# Patient Record
Sex: FEMALE | Race: WHITE | NOT HISPANIC OR LATINO | Employment: FULL TIME | URBAN - METROPOLITAN AREA
[De-identification: names, ages, dates, MRNs, and addresses within clinical notes are randomized per-mention and may not be internally consistent; named-entity substitution may affect disease eponyms.]

---

## 2023-01-04 ENCOUNTER — PROCEDURE VISIT (OUTPATIENT)
Dept: OBSTETRICS AND GYNECOLOGY | Facility: CLINIC | Age: 73
End: 2023-01-04
Payer: COMMERCIAL

## 2023-01-04 ENCOUNTER — OFFICE VISIT (OUTPATIENT)
Dept: OBSTETRICS AND GYNECOLOGY | Facility: CLINIC | Age: 73
End: 2023-01-04
Payer: COMMERCIAL

## 2023-01-04 VITALS
DIASTOLIC BLOOD PRESSURE: 60 MMHG | BODY MASS INDEX: 22.5 KG/M2 | HEIGHT: 66 IN | WEIGHT: 140 LBS | SYSTOLIC BLOOD PRESSURE: 108 MMHG

## 2023-01-04 DIAGNOSIS — Z13.220 ENCOUNTER FOR LIPID SCREENING FOR CARDIOVASCULAR DISEASE: ICD-10-CM

## 2023-01-04 DIAGNOSIS — Z01.419 ENCOUNTER FOR GYNECOLOGICAL EXAMINATION WITHOUT ABNORMAL FINDING: Primary | ICD-10-CM

## 2023-01-04 DIAGNOSIS — Z78.0 POSTMENOPAUSAL STATE: ICD-10-CM

## 2023-01-04 DIAGNOSIS — Z12.31 VISIT FOR SCREENING MAMMOGRAM: Primary | ICD-10-CM

## 2023-01-04 DIAGNOSIS — T73.3XXA FATIGUE DUE TO EXCESSIVE EXERTION, INITIAL ENCOUNTER: ICD-10-CM

## 2023-01-04 DIAGNOSIS — Z13.6 ENCOUNTER FOR LIPID SCREENING FOR CARDIOVASCULAR DISEASE: ICD-10-CM

## 2023-01-04 PROCEDURE — 99387 INIT PM E/M NEW PAT 65+ YRS: CPT | Performed by: OBSTETRICS & GYNECOLOGY

## 2023-01-04 PROCEDURE — 77067 SCR MAMMO BI INCL CAD: CPT | Performed by: OBSTETRICS & GYNECOLOGY

## 2023-01-04 PROCEDURE — 77063 BREAST TOMOSYNTHESIS BI: CPT | Performed by: OBSTETRICS & GYNECOLOGY

## 2023-01-04 NOTE — PROGRESS NOTES
GYN Annual Exam     CC- Here for annual exam.     Lilia Rosado is a 72 y.o. female who presents for annual well woman exam. She is menopausal.  She is experiencing and/or reports no new menopausal symptoms.  She denies postmenopausal vaginal bleeding.  She denies abdominal pain, diarrhea and cough.  Today, she wishes to specifically address just routine screening measures.  I explained to her that current ACOG guidelines state that screening Pap smears are no longer recommended after the age of 65, nor after hysterectomy for benign reasons.  She had a brief outbreak of some vaginal and vulvar lesions and thought it was due to potentially asymptomatic COVID.  These lesions resolved and she does request a Pap smear.    OB History        4    Para   4    Term   4            AB        Living   4       SAB        IAB        Ectopic        Molar        Multiple        Live Births   4                Current contraception: post menopausal status  History of abnormal Pap smear: no  Family history of uterine, colon or ovarian cancer: yes - Paternal grandfather  History of abnormal mammogram: no  Family history of breast cancer: yes - Maternal grandmother  Last Pap : 5 years ago  Last mammogram: Several years ago  Last colonoscopy:   Last DEXA: Due to be scheduled      History reviewed. No pertinent past medical history.    Past Surgical History:   Procedure Laterality Date   • BREAST BIOPSY     • WISDOM TOOTH EXTRACTION         No current outpatient medications on file.    Allergies   Allergen Reactions   • Penicillins Hives and Swelling   • Aspirin Swelling   • Sulfa Antibiotics Swelling   • Erythromycin Nausea And Vomiting       Social History     Tobacco Use   • Smoking status: Never   • Smokeless tobacco: Never   Vaping Use   • Vaping Use: Never used   Substance Use Topics   • Alcohol use: Never   • Drug use: Never       Family History   Problem Relation Age of Onset   • Hypertension Father    •  Hypertension Mother    • Hypertension Brother    • Colon cancer Paternal Grandfather    • Breast cancer Maternal Grandmother        Review of Systems   Constitutional: Positive for fatigue. Negative for fever.   Respiratory: Negative for cough and shortness of breath.    Genitourinary: Negative for genital sores, urinary incontinence and vaginal bleeding.   Patient reports that she is not currently experiencing any symptoms of urinary incontinence.      /60   Ht 167.6 cm (66\")   Wt 63.5 kg (140 lb)   BMI 22.60 kg/m²     Physical Exam  Constitutional:       Appearance: She is normal weight.   Genitourinary:      Bladder and urethral meatus normal.      No lesions in the vagina.      Right Labia: No lesions.     Left Labia: No lesions.     No vaginal discharge, tenderness or bleeding.      No vaginal prolapse present.     No vaginal atrophy present.       Right Adnexa: not tender, not full and no mass present.     Left Adnexa: not tender, not full and no mass present.     No cervical motion tenderness, discharge or lesion.      Uterus is not enlarged, fixed or tender.      No uterine mass detected.     Uterus is midaxial.   Breasts:     Right: No mass, nipple discharge, skin change or tenderness.      Left: No mass, nipple discharge, skin change or tenderness.   Neck:      Thyroid: No thyroid mass or thyromegaly.   Abdominal:      General: Abdomen is flat.      Palpations: Abdomen is soft. There is no mass.      Tenderness: There is no abdominal tenderness.   Neurological:      Mental Status: She is alert.   Vitals reviewed.             Assessment     1) GYN annual well woman exam.   2) menopausal state.  Doing well.  We will get screening CBC and TSH and lipid panel today.  Will order DEXA study to get her updated.     Plan     1) Breast Health - Clinical breast exam & mammogram reviewed specifically American [employer] Society recommendations for screening specific to her, and Self breast awareness monthly  2)  Pap -done today  3) Smoking status-negative  4) Colon health - screening colonoscopy recommended if not up to date  5) Bone health - Weight bearing exercise, dietary calcium recommendations and vitamin D reviewed.   6) Encouraged to be wary of information obtained via social media and internet based on source and search.   7) Follow up prn and one year      Hola Modi MD   1/4/2023  11:51 EST

## 2023-01-05 LAB
CHOLEST SERPL-MCNC: 290 MG/DL (ref 0–200)
ERYTHROCYTE [DISTWIDTH] IN BLOOD BY AUTOMATED COUNT: 12.7 % (ref 12.3–15.4)
HCT VFR BLD AUTO: 39.8 % (ref 34–46.6)
HDLC SERPL-MCNC: 45 MG/DL (ref 40–60)
HGB BLD-MCNC: 13.3 G/DL (ref 12–15.9)
LDLC SERPL CALC-MCNC: 208 MG/DL (ref 0–100)
MCH RBC QN AUTO: 27.5 PG (ref 26.6–33)
MCHC RBC AUTO-ENTMCNC: 33.4 G/DL (ref 31.5–35.7)
MCV RBC AUTO: 82.2 FL (ref 79–97)
PLATELET # BLD AUTO: 283 10*3/MM3 (ref 140–450)
RBC # BLD AUTO: 4.84 10*6/MM3 (ref 3.77–5.28)
TRIGL SERPL-MCNC: 192 MG/DL (ref 0–150)
TSH SERPL DL<=0.005 MIU/L-ACNC: 1.28 UIU/ML (ref 0.27–4.2)
VLDLC SERPL CALC-MCNC: 37 MG/DL (ref 5–40)
WBC # BLD AUTO: 7.29 10*3/MM3 (ref 3.4–10.8)

## 2023-01-09 ENCOUNTER — PATIENT ROUNDING (BHMG ONLY) (OUTPATIENT)
Dept: OBSTETRICS AND GYNECOLOGY | Facility: CLINIC | Age: 73
End: 2023-01-09
Payer: COMMERCIAL

## 2023-01-09 ENCOUNTER — PATIENT MESSAGE (OUTPATIENT)
Dept: OBSTETRICS AND GYNECOLOGY | Facility: CLINIC | Age: 73
End: 2023-01-09
Payer: COMMERCIAL

## 2023-01-09 NOTE — PROGRESS NOTES
My chart message has been sent to the patient for PATIENT ROUNDING with [facility].

## 2023-01-10 LAB
CONV .: NORMAL
CYTOLOGIST CVX/VAG CYTO: NORMAL
CYTOLOGY CVX/VAG DOC CYTO: NORMAL
CYTOLOGY CVX/VAG DOC THIN PREP: NORMAL
DX ICD CODE: NORMAL
HIV 1 & 2 AB SER-IMP: NORMAL
OTHER STN SPEC: NORMAL
STAT OF ADQ CVX/VAG CYTO-IMP: NORMAL

## 2023-06-05 ENCOUNTER — TELEPHONE (OUTPATIENT)
Dept: OBSTETRICS AND GYNECOLOGY | Facility: CLINIC | Age: 73
End: 2023-06-05
Payer: MEDICARE

## 2023-06-05 NOTE — TELEPHONE ENCOUNTER
Patient is requesting to be seen today. Last Friday, patient states that her left breast became inflamed and irritated, she also noticed small little dent spot on her breast. Patient states that the inflammation has resided but she still has the dent like spots on her breast.     Can patient be seen with you today or sometime this week ?      Please advise,  Thank you  ( pt)

## 2023-06-06 ENCOUNTER — TELEPHONE (OUTPATIENT)
Dept: OBSTETRICS AND GYNECOLOGY | Facility: CLINIC | Age: 73
End: 2023-06-06
Payer: MEDICARE

## 2023-06-06 NOTE — TELEPHONE ENCOUNTER
----- Message from MADDIE Bansal sent at 6/6/2023 11:07 AM EDT -----  Regarding: Inflamed Left Breast  Contact: [phone]  I did not receive a message about this. Please schedule her with f/u this week. Okay to use blocked slots. -Katy       ----- Message -----  From: Ivana Marroquin RN  Sent: 6/6/2023  10:22 AM EDT  To: MADDIE Bansal  Subject: Inflamed Left Breast                             My Chart. AE & Mx 1/4/23 (saw Dr Modi when he was at [facility]). Left breast is inflamed and concerned about inflammatory breast cancer and breast has a burning pain. States she was told the APRN would call her, but the message went to Dr Leong. I   was not sure if you could get her in sooner. Thank you.      ----- Message -----  From: Lilia Rosado  Sent: 6/6/2023   9:41 AM EDT  To: [facility] Clinical [facility]  Subject: Inflamed Left Breast                             I called yesterday and explained my left breast was inflamed and was extremely worried about inflammatory breast cancer. My breast has a burning pain. I was told the nurse practitioner would call. Please call me at [phone].

## 2023-06-06 NOTE — TELEPHONE ENCOUNTER
Spoke with Lilia and she said that Dr Modi is a family friend. Scheduled her to see MADDIE Masterson on Thursday, 6/8/23 at 9:45 am for breast pain and burning. Thank you

## 2023-06-08 ENCOUNTER — TELEPHONE (OUTPATIENT)
Dept: OBSTETRICS AND GYNECOLOGY | Facility: CLINIC | Age: 73
End: 2023-06-08

## 2023-06-08 ENCOUNTER — OFFICE VISIT (OUTPATIENT)
Dept: OBSTETRICS AND GYNECOLOGY | Facility: CLINIC | Age: 73
End: 2023-06-08
Payer: MEDICARE

## 2023-06-08 VITALS
BODY MASS INDEX: 22.79 KG/M2 | WEIGHT: 141.8 LBS | HEART RATE: 78 BPM | DIASTOLIC BLOOD PRESSURE: 81 MMHG | SYSTOLIC BLOOD PRESSURE: 126 MMHG | HEIGHT: 66 IN

## 2023-06-08 DIAGNOSIS — N64.4 BREAST PAIN: Primary | ICD-10-CM

## 2023-06-08 DIAGNOSIS — N64.59 INVERSION OF LEFT NIPPLE: ICD-10-CM

## 2023-06-08 RX ORDER — EVOLOCUMAB 140 MG/ML
INJECTION, SOLUTION SUBCUTANEOUS
COMMUNITY
Start: 2023-05-19

## 2023-06-08 NOTE — TELEPHONE ENCOUNTER
Pt was in office and we scheduled her for a DX Bilat Mammo & Bilat Comp US at [facility] 6/14/23 @ 830 & 9am.

## 2023-06-08 NOTE — PROGRESS NOTES
"Chief Complaint   Patient presents with    Follow-up     Pt here for follow up from last visit with breast issues.     Breast Problem     Inflammation in left breast. States it was red but better now, has a burning sensation.       SUBJECTIVE:     Lilia Rosado is a 73 y.o.  who presents with c/o a breast problem. This is a new problem. She is postmenopausal. She had normal mammogram 2023. She reports to me a hx of dense breast tissue. Prior breast biopsy that was benign she thinks this was 20-30 yr ago.     Reports that 6 days ago her left nipple became red and inflamed, this was additionally very painful, reports burning sensation. States there was redness of the left breast approx 1 fingertip under nipple, states \"half mood shape\" and  \"light red\" in color.  Reports left nipple became inverted as a result of swelling. She is very active outdoors in her garden and had been outside prior to onset. She denies any known injury, insect bite. The majority of these symptoms are now resolved however the pain is still present, also now having pain in right breast. She feels this could possibly be related to new prescription of Repatha. She is concerned for inflammatory breast cancer     This is my first time meeting Lilia Rosado  She is a patient of Dr. Modi's.    History reviewed. No pertinent past medical history.   Past Surgical History:   Procedure Laterality Date    BREAST BIOPSY      WISDOM TOOTH EXTRACTION          Review of Systems   Constitutional:  Negative for chills, diaphoresis and fever.   Genitourinary:         + bilateral breast pain  -nipple drainage  +nipple redness, swelling, inversion-now resolved  - breast masses     OBJECTIVE:   Vitals:    23 0954   BP: 126/81   Pulse: 78   Weight: 64.3 kg (141 lb 12.8 oz)   Height: 167.6 cm (65.98\")        Physical Exam  Constitutional:       General: She is not in acute distress.     Appearance: Normal appearance. She is not ill-appearing, " toxic-appearing or diaphoretic.   Genitourinary:   Breasts:     Breasts are symmetrical.      Right: Present. No swelling, bleeding, inverted nipple, mass, nipple discharge, skin change, tenderness or breast implant.      Left: Present. Skin change (slight ecchymosis of lower portion of left nipple,) present. No swelling, bleeding, inverted nipple, mass, nipple discharge, tenderness or breast implant.   Cardiovascular:      Rate and Rhythm: Normal rate.   Pulmonary:      Effort: Pulmonary effort is normal.   Musculoskeletal:      Cervical back: Normal range of motion.   Lymphadenopathy:      Upper Body:      Right upper body: No supraclavicular or axillary adenopathy.      Left upper body: No supraclavicular or axillary adenopathy.   Neurological:      General: No focal deficit present.      Mental Status: She is alert and oriented to person, place, and time.      Cranial Nerves: No cranial nerve deficit.   Skin:     General: Skin is warm and dry.   Psychiatric:         Mood and Affect: Mood normal.         Behavior: Behavior normal.         Thought Content: Thought content normal.         Judgment: Judgment normal.   Vitals and nursing note reviewed.       Assessment/Plan    Diagnoses and all orders for this visit:    1. Breast pain (Primary)  -     Mammo Diagnostic Digital Tomosynthesis Bilateral With CAD; Future  -     US Breast Bilateral Complete; Future    2. Inversion of left nipple  -     Mammo Diagnostic Digital Tomosynthesis Bilateral With CAD; Future  -     US Breast Bilateral Complete; Future    Normal breast exam, no erythema, masses, dimpling, puckering, or asymmetry.   There is slight ecchymosis left areola   Reassured pt, no signs of inflammatory breast cancer at this time, discussed possible causes, trauma, dermatitis, insect bite  Will complete breast imaging to further evaluate  Encouraged snug supportive bra, sleep in sports bra, decrease caffeine intake    Return if symptoms worsen or fail to  improve.    I spent 31 minutes caring for Lilia on this date of service. This time includes time spent by me in the following activities: preparing for the visit, reviewing tests, obtaining and/or reviewing a separately obtained history, performing a medically appropriate examination and/or evaluation, counseling and educating the patient/family/caregiver, ordering medications, tests, or procedures, referring and communicating with other health care professionals, and documenting information in the medical record    Katy Poole, APRN  6/8/2023  12:46 EDT

## 2023-06-15 DIAGNOSIS — N64.4 BREAST PAIN: ICD-10-CM

## 2023-06-15 DIAGNOSIS — N64.59 INVERSION OF LEFT NIPPLE: ICD-10-CM

## 2023-11-01 ENCOUNTER — CLINICAL SUPPORT (OUTPATIENT)
Dept: OBSTETRICS AND GYNECOLOGY | Facility: CLINIC | Age: 73
End: 2023-11-01
Payer: MEDICARE

## 2023-11-01 ENCOUNTER — TELEPHONE (OUTPATIENT)
Dept: OBSTETRICS AND GYNECOLOGY | Facility: CLINIC | Age: 73
End: 2023-11-01
Payer: MEDICARE

## 2023-11-01 DIAGNOSIS — R30.0 DYSURIA: Primary | ICD-10-CM

## 2023-11-01 LAB
BILIRUB BLD-MCNC: NEGATIVE MG/DL
CLARITY, POC: CLEAR
COLOR UR: YELLOW
GLUCOSE UR STRIP-MCNC: NEGATIVE MG/DL
KETONES UR QL: NEGATIVE
LEUKOCYTE EST, POC: NEGATIVE
NITRITE UR-MCNC: NEGATIVE MG/ML
PH UR: 6 [PH] (ref 5–8)
PROT UR STRIP-MCNC: NEGATIVE MG/DL
RBC # UR STRIP: ABNORMAL /UL
SP GR UR: 1.01 (ref 1–1.03)
UROBILINOGEN UR QL: ABNORMAL

## 2023-11-01 NOTE — TELEPHONE ENCOUNTER
Pt believes she has possible uti and is requesting to leave urine sample. If ok, can you please put in order. Thank you!

## 2023-11-03 ENCOUNTER — TELEPHONE (OUTPATIENT)
Dept: OBSTETRICS AND GYNECOLOGY | Facility: CLINIC | Age: 73
End: 2023-11-03
Payer: MEDICARE

## 2023-11-03 LAB
BACTERIA UR CULT: NORMAL
BACTERIA UR CULT: NORMAL

## 2023-11-03 NOTE — TELEPHONE ENCOUNTER
----- Message from Hola Modi MD sent at 11/3/2023  8:16 AM EDT -----  Shoshana, your urine culture was not consistent with a urinary tract infection.  You did have some microscopic blood in the urine.  Sometimes this is transient or could be related to a small stone somewhere in the urinary tract.  We can repeat the urinalysis and if it is persistent, usually would recommend evaluation by urology.

## 2023-11-06 ENCOUNTER — TELEPHONE (OUTPATIENT)
Dept: OBSTETRICS AND GYNECOLOGY | Facility: CLINIC | Age: 73
End: 2023-11-06
Payer: MEDICARE

## 2023-11-06 NOTE — TELEPHONE ENCOUNTER
Patient understands urine results.    Also, wants you to know that her nipple still dimple/inverted. Imaging from 6/2023 was negative. She also, wants to discuss Estrogen vaginal cream, if it's safe.

## 2023-11-10 RX ORDER — ESTRADIOL 0.1 MG/G
2 CREAM VAGINAL DAILY
Qty: 42.5 G | Refills: 4 | Status: SHIPPED | OUTPATIENT
Start: 2023-11-10 | End: 2023-11-24